# Patient Record
Sex: MALE | Race: ASIAN | Employment: FULL TIME | URBAN - METROPOLITAN AREA
[De-identification: names, ages, dates, MRNs, and addresses within clinical notes are randomized per-mention and may not be internally consistent; named-entity substitution may affect disease eponyms.]

---

## 2017-09-06 ENCOUNTER — APPOINTMENT (OUTPATIENT)
Dept: GENERAL RADIOLOGY | Facility: HOSPITAL | Age: 37
DRG: 418 | End: 2017-09-06
Attending: EMERGENCY MEDICINE
Payer: COMMERCIAL

## 2017-09-06 ENCOUNTER — HOSPITAL ENCOUNTER (INPATIENT)
Facility: HOSPITAL | Age: 37
LOS: 1 days | Discharge: HOME | DRG: 418 | End: 2017-09-08
Attending: EMERGENCY MEDICINE | Admitting: HOSPITALIST
Payer: COMMERCIAL

## 2017-09-06 ENCOUNTER — APPOINTMENT (OUTPATIENT)
Dept: ULTRASOUND IMAGING | Facility: HOSPITAL | Age: 37
DRG: 418 | End: 2017-09-06
Attending: EMERGENCY MEDICINE
Payer: COMMERCIAL

## 2017-09-06 DIAGNOSIS — K80.50 CHOLEDOCHOLITHIASIS: Primary | ICD-10-CM

## 2017-09-06 DIAGNOSIS — K81.9 CHOLECYSTITIS: ICD-10-CM

## 2017-09-06 DIAGNOSIS — D69.6 THROMBOCYTOPENIA (HCC): ICD-10-CM

## 2017-09-06 PROBLEM — R73.9 HYPERGLYCEMIA: Status: ACTIVE | Noted: 2017-09-06

## 2017-09-06 PROBLEM — D72.829 LEUKOCYTOSIS: Status: ACTIVE | Noted: 2017-09-06

## 2017-09-06 LAB
ALBUMIN SERPL-MCNC: 4 G/DL (ref 3.5–4.8)
ALP LIVER SERPL-CCNC: 63 U/L (ref 45–117)
ALT SERPL-CCNC: 43 U/L (ref 17–63)
AST SERPL-CCNC: 29 U/L (ref 15–41)
BASOPHILS # BLD AUTO: 0.03 X10(3) UL (ref 0–0.1)
BASOPHILS NFR BLD AUTO: 0.2 %
BILIRUB SERPL-MCNC: 0.9 MG/DL (ref 0.1–2)
BUN BLD-MCNC: 12 MG/DL (ref 8–20)
CALCIUM BLD-MCNC: 8.7 MG/DL (ref 8.3–10.3)
CHLORIDE: 104 MMOL/L (ref 101–111)
CO2: 26 MMOL/L (ref 22–32)
CREAT BLD-MCNC: 1.01 MG/DL (ref 0.7–1.3)
EOSINOPHIL # BLD AUTO: 0 X10(3) UL (ref 0–0.3)
EOSINOPHIL NFR BLD AUTO: 0 %
ERYTHROCYTE [DISTWIDTH] IN BLOOD BY AUTOMATED COUNT: 12.6 % (ref 11.5–16)
GLUCOSE BLD-MCNC: 124 MG/DL (ref 70–99)
HCT VFR BLD AUTO: 42.9 % (ref 37–53)
HGB BLD-MCNC: 14 G/DL (ref 13–17)
IMMATURE GRANULOCYTE COUNT: 0.09 X10(3) UL (ref 0–1)
IMMATURE GRANULOCYTE RATIO %: 0.5 %
LIPASE: 84 U/L (ref 73–393)
LYMPHOCYTES # BLD AUTO: 0.76 X10(3) UL (ref 0.9–4)
LYMPHOCYTES NFR BLD AUTO: 4.2 %
M PROTEIN MFR SERPL ELPH: 7.9 G/DL (ref 6.1–8.3)
MCH RBC QN AUTO: 29.2 PG (ref 27–33.2)
MCHC RBC AUTO-ENTMCNC: 32.6 G/DL (ref 31–37)
MCV RBC AUTO: 89.6 FL (ref 80–99)
MONOCYTES # BLD AUTO: 1.21 X10(3) UL (ref 0.1–0.6)
MONOCYTES NFR BLD AUTO: 6.7 %
NEUTROPHIL ABS PRELIM: 16 X10 (3) UL (ref 1.3–6.7)
NEUTROPHILS # BLD AUTO: 16 X10(3) UL (ref 1.3–6.7)
NEUTROPHILS NFR BLD AUTO: 88.4 %
PLATELET # BLD AUTO: 110 10(3)UL (ref 150–450)
POTASSIUM SERPL-SCNC: 4.2 MMOL/L (ref 3.6–5.1)
RBC # BLD AUTO: 4.79 X10(6)UL (ref 4.3–5.7)
RED CELL DISTRIBUTION WIDTH-SD: 41.4 FL (ref 35.1–46.3)
SODIUM SERPL-SCNC: 138 MMOL/L (ref 136–144)
WBC # BLD AUTO: 18.1 X10(3) UL (ref 4–13)

## 2017-09-06 PROCEDURE — 76700 US EXAM ABDOM COMPLETE: CPT | Performed by: EMERGENCY MEDICINE

## 2017-09-06 PROCEDURE — 71010 XR CHEST AP PORTABLE  (CPT=71010): CPT | Performed by: EMERGENCY MEDICINE

## 2017-09-06 RX ORDER — SODIUM CHLORIDE 9 MG/ML
INJECTION, SOLUTION INTRAVENOUS CONTINUOUS
Status: CANCELLED | OUTPATIENT
Start: 2017-09-06 | End: 2017-09-07

## 2017-09-06 RX ORDER — HYDROMORPHONE HYDROCHLORIDE 1 MG/ML
0.5 INJECTION, SOLUTION INTRAMUSCULAR; INTRAVENOUS; SUBCUTANEOUS EVERY 30 MIN PRN
Status: CANCELLED | OUTPATIENT
Start: 2017-09-06 | End: 2017-09-07

## 2017-09-06 RX ORDER — ONDANSETRON 2 MG/ML
4 INJECTION INTRAMUSCULAR; INTRAVENOUS EVERY 4 HOURS PRN
Status: CANCELLED | OUTPATIENT
Start: 2017-09-06

## 2017-09-06 RX ORDER — ONDANSETRON 2 MG/ML
4 INJECTION INTRAMUSCULAR; INTRAVENOUS ONCE
Status: COMPLETED | OUTPATIENT
Start: 2017-09-06 | End: 2017-09-06

## 2017-09-06 RX ORDER — HYDROMORPHONE HYDROCHLORIDE 1 MG/ML
1 INJECTION, SOLUTION INTRAMUSCULAR; INTRAVENOUS; SUBCUTANEOUS EVERY 30 MIN PRN
Status: DISCONTINUED | OUTPATIENT
Start: 2017-09-06 | End: 2017-09-08

## 2017-09-07 ENCOUNTER — ANESTHESIA (OUTPATIENT)
Dept: SURGERY | Facility: HOSPITAL | Age: 37
DRG: 418 | End: 2017-09-07
Payer: COMMERCIAL

## 2017-09-07 ENCOUNTER — APPOINTMENT (OUTPATIENT)
Dept: GENERAL RADIOLOGY | Facility: HOSPITAL | Age: 37
DRG: 418 | End: 2017-09-07
Attending: SURGERY
Payer: COMMERCIAL

## 2017-09-07 ENCOUNTER — SURGERY (OUTPATIENT)
Age: 37
End: 2017-09-07

## 2017-09-07 ENCOUNTER — ANESTHESIA EVENT (OUTPATIENT)
Dept: SURGERY | Facility: HOSPITAL | Age: 37
DRG: 418 | End: 2017-09-07
Payer: COMMERCIAL

## 2017-09-07 LAB
ALBUMIN SERPL-MCNC: 3.2 G/DL (ref 3.5–4.8)
ALP LIVER SERPL-CCNC: 57 U/L (ref 45–117)
ALT SERPL-CCNC: 33 U/L (ref 17–63)
AST SERPL-CCNC: 21 U/L (ref 15–41)
BASOPHILS # BLD AUTO: 0.03 X10(3) UL (ref 0–0.1)
BASOPHILS NFR BLD AUTO: 0.2 %
BILIRUB SERPL-MCNC: 1 MG/DL (ref 0.1–2)
BUN BLD-MCNC: 13 MG/DL (ref 8–20)
CALCIUM BLD-MCNC: 7.8 MG/DL (ref 8.3–10.3)
CHLORIDE: 106 MMOL/L (ref 101–111)
CO2: 27 MMOL/L (ref 22–32)
CREAT BLD-MCNC: 1.15 MG/DL (ref 0.7–1.3)
EOSINOPHIL # BLD AUTO: 0.01 X10(3) UL (ref 0–0.3)
EOSINOPHIL NFR BLD AUTO: 0.1 %
ERYTHROCYTE [DISTWIDTH] IN BLOOD BY AUTOMATED COUNT: 12.9 % (ref 11.5–16)
ERYTHROCYTE [DISTWIDTH] IN BLOOD BY AUTOMATED COUNT: 13 % (ref 11.5–16)
GLUCOSE BLD-MCNC: 108 MG/DL (ref 70–99)
HCT VFR BLD AUTO: 35.6 % (ref 37–53)
HCT VFR BLD AUTO: 36.5 % (ref 37–53)
HGB BLD-MCNC: 11.9 G/DL (ref 13–17)
HGB BLD-MCNC: 12.1 G/DL (ref 13–17)
IMMATURE GRANULOCYTE COUNT: 0.09 X10(3) UL (ref 0–1)
IMMATURE GRANULOCYTE RATIO %: 0.7 %
INR BLD: 1.41 (ref 0.89–1.11)
LYMPHOCYTES # BLD AUTO: 0.99 X10(3) UL (ref 0.9–4)
LYMPHOCYTES NFR BLD AUTO: 7.5 %
M PROTEIN MFR SERPL ELPH: 6.5 G/DL (ref 6.1–8.3)
MCH RBC QN AUTO: 30.1 PG (ref 27–33.2)
MCH RBC QN AUTO: 30.2 PG (ref 27–33.2)
MCHC RBC AUTO-ENTMCNC: 33.2 G/DL (ref 31–37)
MCHC RBC AUTO-ENTMCNC: 33.4 G/DL (ref 31–37)
MCV RBC AUTO: 90.4 FL (ref 80–99)
MCV RBC AUTO: 90.8 FL (ref 80–99)
MONOCYTES # BLD AUTO: 1.08 X10(3) UL (ref 0.1–0.6)
MONOCYTES NFR BLD AUTO: 8.2 %
NEUTROPHIL ABS PRELIM: 10.96 X10 (3) UL (ref 1.3–6.7)
NEUTROPHILS # BLD AUTO: 10.96 X10(3) UL (ref 1.3–6.7)
NEUTROPHILS NFR BLD AUTO: 83.3 %
PLATELET # BLD AUTO: 83 10(3)UL (ref 150–450)
PLATELET # BLD AUTO: 84 10(3)UL (ref 150–450)
POTASSIUM SERPL-SCNC: 3.8 MMOL/L (ref 3.6–5.1)
PSA SERPL DL<=0.01 NG/ML-MCNC: 17.4 SECONDS (ref 12–14.3)
RBC # BLD AUTO: 3.94 X10(6)UL (ref 4.3–5.7)
RBC # BLD AUTO: 4.02 X10(6)UL (ref 4.3–5.7)
RED CELL DISTRIBUTION WIDTH-SD: 42.9 FL (ref 35.1–46.3)
RED CELL DISTRIBUTION WIDTH-SD: 43.3 FL (ref 35.1–46.3)
SODIUM SERPL-SCNC: 140 MMOL/L (ref 136–144)
WBC # BLD AUTO: 13.2 X10(3) UL (ref 4–13)
WBC # BLD AUTO: 16.5 X10(3) UL (ref 4–13)

## 2017-09-07 PROCEDURE — BF13YZZ FLUOROSCOPY OF GALLBLADDER AND BILE DUCTS USING OTHER CONTRAST: ICD-10-PCS | Performed by: SURGERY

## 2017-09-07 PROCEDURE — 74300 X-RAY BILE DUCTS/PANCREAS: CPT | Performed by: SURGERY

## 2017-09-07 PROCEDURE — 0FT44ZZ RESECTION OF GALLBLADDER, PERCUTANEOUS ENDOSCOPIC APPROACH: ICD-10-PCS | Performed by: SURGERY

## 2017-09-07 PROCEDURE — 99223 1ST HOSP IP/OBS HIGH 75: CPT | Performed by: HOSPITALIST

## 2017-09-07 RX ORDER — HYDROMORPHONE HYDROCHLORIDE 1 MG/ML
0.4 INJECTION, SOLUTION INTRAMUSCULAR; INTRAVENOUS; SUBCUTANEOUS EVERY 2 HOUR PRN
Status: DISCONTINUED | OUTPATIENT
Start: 2017-09-07 | End: 2017-09-08

## 2017-09-07 RX ORDER — ONDANSETRON 2 MG/ML
4 INJECTION INTRAMUSCULAR; INTRAVENOUS EVERY 6 HOURS PRN
Status: DISCONTINUED | OUTPATIENT
Start: 2017-09-07 | End: 2017-09-07

## 2017-09-07 RX ORDER — HYDROCODONE BITARTRATE AND ACETAMINOPHEN 5; 325 MG/1; MG/1
2 TABLET ORAL EVERY 4 HOURS PRN
Status: DISCONTINUED | OUTPATIENT
Start: 2017-09-07 | End: 2017-09-08

## 2017-09-07 RX ORDER — HYDROCODONE BITARTRATE AND ACETAMINOPHEN 10; 325 MG/1; MG/1
1 TABLET ORAL AS NEEDED
Status: DISCONTINUED | OUTPATIENT
Start: 2017-09-07 | End: 2017-09-07 | Stop reason: HOSPADM

## 2017-09-07 RX ORDER — ACETAMINOPHEN 325 MG/1
650 TABLET ORAL EVERY 6 HOURS PRN
Status: DISCONTINUED | OUTPATIENT
Start: 2017-09-07 | End: 2017-09-08

## 2017-09-07 RX ORDER — HYDROMORPHONE HYDROCHLORIDE 1 MG/ML
0.2 INJECTION, SOLUTION INTRAMUSCULAR; INTRAVENOUS; SUBCUTANEOUS EVERY 2 HOUR PRN
Status: DISCONTINUED | OUTPATIENT
Start: 2017-09-07 | End: 2017-09-08

## 2017-09-07 RX ORDER — HYDROMORPHONE HYDROCHLORIDE 1 MG/ML
0.2 INJECTION, SOLUTION INTRAMUSCULAR; INTRAVENOUS; SUBCUTANEOUS EVERY 2 HOUR PRN
Status: DISCONTINUED | OUTPATIENT
Start: 2017-09-07 | End: 2017-09-07

## 2017-09-07 RX ORDER — HYDROMORPHONE HYDROCHLORIDE 1 MG/ML
0.8 INJECTION, SOLUTION INTRAMUSCULAR; INTRAVENOUS; SUBCUTANEOUS EVERY 2 HOUR PRN
Status: DISCONTINUED | OUTPATIENT
Start: 2017-09-07 | End: 2017-09-08

## 2017-09-07 RX ORDER — ACETAMINOPHEN 325 MG/1
650 TABLET ORAL EVERY 4 HOURS PRN
Status: DISCONTINUED | OUTPATIENT
Start: 2017-09-07 | End: 2017-09-07

## 2017-09-07 RX ORDER — HYDROMORPHONE HYDROCHLORIDE 1 MG/ML
0.8 INJECTION, SOLUTION INTRAMUSCULAR; INTRAVENOUS; SUBCUTANEOUS EVERY 2 HOUR PRN
Status: DISCONTINUED | OUTPATIENT
Start: 2017-09-07 | End: 2017-09-07

## 2017-09-07 RX ORDER — ENOXAPARIN SODIUM 100 MG/ML
40 INJECTION SUBCUTANEOUS DAILY
Status: DISCONTINUED | OUTPATIENT
Start: 2017-09-08 | End: 2017-09-08

## 2017-09-07 RX ORDER — POTASSIUM CHLORIDE 14.9 MG/ML
20 INJECTION INTRAVENOUS ONCE
Status: COMPLETED | OUTPATIENT
Start: 2017-09-07 | End: 2017-09-07

## 2017-09-07 RX ORDER — ACETAMINOPHEN 325 MG/1
TABLET ORAL
Status: DISPENSED
Start: 2017-09-07 | End: 2017-09-07

## 2017-09-07 RX ORDER — DEXAMETHASONE SODIUM PHOSPHATE 4 MG/ML
4 VIAL (ML) INJECTION AS NEEDED
Status: DISCONTINUED | OUTPATIENT
Start: 2017-09-07 | End: 2017-09-07 | Stop reason: HOSPADM

## 2017-09-07 RX ORDER — HYDROCODONE BITARTRATE AND ACETAMINOPHEN 5; 325 MG/1; MG/1
1 TABLET ORAL EVERY 4 HOURS PRN
Status: DISCONTINUED | OUTPATIENT
Start: 2017-09-07 | End: 2017-09-08

## 2017-09-07 RX ORDER — MIDAZOLAM HYDROCHLORIDE 1 MG/ML
1 INJECTION INTRAMUSCULAR; INTRAVENOUS EVERY 5 MIN PRN
Status: DISCONTINUED | OUTPATIENT
Start: 2017-09-07 | End: 2017-09-07 | Stop reason: HOSPADM

## 2017-09-07 RX ORDER — HYDROMORPHONE HYDROCHLORIDE 1 MG/ML
0.4 INJECTION, SOLUTION INTRAMUSCULAR; INTRAVENOUS; SUBCUTANEOUS EVERY 2 HOUR PRN
Status: DISCONTINUED | OUTPATIENT
Start: 2017-09-07 | End: 2017-09-07

## 2017-09-07 RX ORDER — HYDROMORPHONE HYDROCHLORIDE 1 MG/ML
0.4 INJECTION, SOLUTION INTRAMUSCULAR; INTRAVENOUS; SUBCUTANEOUS EVERY 5 MIN PRN
Status: DISCONTINUED | OUTPATIENT
Start: 2017-09-07 | End: 2017-09-07 | Stop reason: HOSPADM

## 2017-09-07 RX ORDER — HYDROCODONE BITARTRATE AND ACETAMINOPHEN 10; 325 MG/1; MG/1
2 TABLET ORAL AS NEEDED
Status: DISCONTINUED | OUTPATIENT
Start: 2017-09-07 | End: 2017-09-07 | Stop reason: HOSPADM

## 2017-09-07 RX ORDER — DIPHENHYDRAMINE HCL 25 MG
25 CAPSULE ORAL NIGHTLY PRN
Status: DISCONTINUED | OUTPATIENT
Start: 2017-09-07 | End: 2017-09-08

## 2017-09-07 RX ORDER — ONDANSETRON 2 MG/ML
4 INJECTION INTRAMUSCULAR; INTRAVENOUS AS NEEDED
Status: DISCONTINUED | OUTPATIENT
Start: 2017-09-07 | End: 2017-09-07 | Stop reason: HOSPADM

## 2017-09-07 RX ORDER — HYDROMORPHONE HYDROCHLORIDE 1 MG/ML
INJECTION, SOLUTION INTRAMUSCULAR; INTRAVENOUS; SUBCUTANEOUS
Status: COMPLETED
Start: 2017-09-07 | End: 2017-09-07

## 2017-09-07 RX ORDER — SODIUM CHLORIDE, SODIUM LACTATE, POTASSIUM CHLORIDE, CALCIUM CHLORIDE 600; 310; 30; 20 MG/100ML; MG/100ML; MG/100ML; MG/100ML
INJECTION, SOLUTION INTRAVENOUS CONTINUOUS
Status: DISCONTINUED | OUTPATIENT
Start: 2017-09-07 | End: 2017-09-08

## 2017-09-07 RX ORDER — NALOXONE HYDROCHLORIDE 0.4 MG/ML
80 INJECTION, SOLUTION INTRAMUSCULAR; INTRAVENOUS; SUBCUTANEOUS AS NEEDED
Status: DISCONTINUED | OUTPATIENT
Start: 2017-09-07 | End: 2017-09-07 | Stop reason: HOSPADM

## 2017-09-07 RX ORDER — SODIUM CHLORIDE 9 MG/ML
INJECTION, SOLUTION INTRAVENOUS CONTINUOUS
Status: DISCONTINUED | OUTPATIENT
Start: 2017-09-07 | End: 2017-09-08

## 2017-09-07 RX ORDER — BUPIVACAINE HYDROCHLORIDE AND EPINEPHRINE 5; 5 MG/ML; UG/ML
INJECTION, SOLUTION EPIDURAL; INTRACAUDAL; PERINEURAL AS NEEDED
Status: DISCONTINUED | OUTPATIENT
Start: 2017-09-07 | End: 2017-09-07 | Stop reason: HOSPADM

## 2017-09-07 RX ORDER — ONDANSETRON 2 MG/ML
4 INJECTION INTRAMUSCULAR; INTRAVENOUS EVERY 6 HOURS PRN
Status: DISCONTINUED | OUTPATIENT
Start: 2017-09-07 | End: 2017-09-08

## 2017-09-07 RX ORDER — MEPERIDINE HYDROCHLORIDE 25 MG/ML
12.5 INJECTION INTRAMUSCULAR; INTRAVENOUS; SUBCUTANEOUS AS NEEDED
Status: DISCONTINUED | OUTPATIENT
Start: 2017-09-07 | End: 2017-09-07 | Stop reason: HOSPADM

## 2017-09-07 NOTE — ANESTHESIA PREPROCEDURE EVALUATION
PRE-OP EVALUATION    Patient Name: Narendra Carty    Pre-op Diagnosis: Cholecystitis [K81.9]    Procedure(s):  LAPAROSCOPIC CHOLECYSTECTOMY WITH INTRAOPERATIVE CHOLANGIOGRAM    Surgeon(s) and Role:     Lily Gil MD - Primary    Pre-o Pulmonary    Negative pulmonary ROS. Neuro/Psych    Negative neuro/psych ROS. History reviewed. No pertinent surgical history.      Smoking status: Never Smoker    Smokeless tobacco: Never Used    [employer]

## 2017-09-07 NOTE — PLAN OF CARE
PAIN - ADULT    • Verbalizes/displays adequate comfort level or patient's stated pain goal Progressing        RISK FOR INFECTION - ADULT    • Absence of fever/infection during anticipated neutropenic period Progressing        Upon assessment, A&OX4.  Pain r

## 2017-09-07 NOTE — PAYOR COMM NOTE
--------------  ADMISSION REVIEW     Payor: [facility] #:  [id number]  Authorization Number: [id number]    Admit date: 9/7/17  Admit time: 5193       Admitting Physician: Jairon Chun DO  Attending Physician:  Abraham Winston MD Temporal  SpO2: 98 %  O2 Device: None (Room air)    Current:/71   Pulse 92   Temp 100.1 °F (37.8 °C) (Temporal)   Resp 16   Wt 78 kg   SpO2 92%         Physical Exam  General: This a pleasant nontoxic appearing patient in no apparent distress alert a CBC W/ DIFFERENTIAL[[id number]]          Abnormal            Final result                 Please view results for these tests on the individual orders.      PROCEDURE:  XR CHEST AP PORTABLE (CPT=71010)     TECHNIQUE:  AP chest radiograph was o 21:00       Approved by: Félix Felipe MD              ============================================================  ED Course  ------------------------------------------------------------  MDM   Patient's white count was 18,000.   His transaminases were no Abdominal pain  History of Present Illness: Epifanio Cox is a 40year old male with no significant past medical history woke up earlier yesterday morning with abdominal pain pain was in the right upper quadrant radiating to the back was a d rhonchi. Cardiovascular: S1, S2. Regular rate and rhythm. No murmurs, rubs or gallops. Equal pulses. Chest and Back: No tenderness or deformity. Abdomen: Soft, tendernes in right upper quadrant on palpation, nondistended. Positive bowel sounds.  No nehemiah DAY:  acetaminophen (TYLENOL) tab 650 mg     Date Action Dose Route User    9/7/2017 0106 Given 650 mg Oral Ailyn Brizuela, RN      CefOXitin Sodium (MEFOXIN) 1 g in sodium chloride 0.9 % 100 mL IVPB-minibag     Date Action Dose Route User    9/7/2017 5683

## 2017-09-07 NOTE — ANESTHESIA POSTPROCEDURE EVALUATION
[address] Patient Status:  Inpatient   Age/Gender 40year old male MRN [id number]   Location [address] [facility] Attending Gretta Orona MD   Hosp Day # 0 PCP No primary care provider on file.        Anesthesia Pos

## 2017-09-07 NOTE — ED INITIAL ASSESSMENT (HPI)
Pt started with periumbilical abd pain yesterday, today RLQ pain. Pt works at [employer] in [place] had [address] done there yesterday, discharged today. Pt deny fever. Vomiting yesterday several times, none today.

## 2017-09-07 NOTE — CONSULTS
[facility]  Report of Consultation    Meagan Ceciltommy Villa Patient Status:  Inpatient    1980 MRN [id number]   [facility] 3NW-A Attending Abraham Winston MD   Hosp Day # 0 PCP No primary care provider on file.      Reason f smokeless tobacco. He reports that he does not drink alcohol.     Allergies:  No Known Allergies    Medications:    Current Facility-Administered Medications:   •  0.9%  NaCl infusion, , Intravenous, Continuous  •  HYDROmorphone HCl PF (DILAUDID) 1 MG/ML in cough, dyspnea and wheezing      Physical Exam:  Blood pressure 118/73, pulse 97, temperature 97.9 °F (36.6 °C), temperature source Oral, resp. rate 20, weight 172 lb, SpO2 98 %. General: Alert, orientated x3. Cooperative. No apparent distress.     KIM liver.  BILIARY:  Echogenic mobile foci within the gallbladder consistent with multiple gallstones. Gallbladder wall thickness is at the limits of normal measuring 3 mm. The common bile duct is slightly enlarged at 7 mm.  There is no sonographic Payne sign and was evaluated at [facility]. He was told to consider cholecystectomy but as he was feeling better, he chose to discharge  From the hospital and return home to [place].   He was able to travel home but came to [facility] for increased abdominal

## 2017-09-07 NOTE — CONSULTS
[facility]                       Gastroenterology Consultation-[facility]    Ruel Marisol Dennis Patient Status:  Inpatient    1980 MRN [id number]   [facility] 3NW-A Attending Zbigniew Prather MD   [facility]) injection 4 mg 4 mg Intravenous Q6H PRN   acetaminophen (TYLENOL) tab 650 mg 650 mg Oral Q6H PRN   acetaminophen (TYLENOL) 325 MG tab      potassium chloride IVPB premix 20 mEq 20 mEq Intravenous Once   [COMPLETED] sodium chloride 0.9% IV bolus 1, hoarseness of voice, hearing loss, sinus congestion, tinnitus           Neurologic: The patient reports no history of seizure, stroke, or frequent headaches  PE: /73 (BP Location: Right arm)   Pulse 97   Temp 97.9 °F (36.6 °C) (Oral)   Resp 20   Wt 1  XR CHEST AP PORTABLE (CPT=71010)     TECHNIQUE:  AP chest radiograph was obtained.     COMPARISON:  None.     INDICATIONS:  ABD PAIN     PATIENT STATED HISTORY: (As transcribed by Technologist)  Patient offered no additional history at this time.         gallbladder wall thickening, and a CBD 7 mm; labs reveal leukocytosis (18.1) and normal LFTs/lipase. Abd is soft, no tenderness after narcotics. On for lap nadine today with IOC; if IOC is positive will plan for ERCP in AM    Recommendations:     1.  Plan fo

## 2017-09-07 NOTE — PROGRESS NOTES
RECEIVED PT FROM PACU , ALERT AND ORIENT X 3 , VERY SLEEPY , EASILY AWAKE, LAP X 3 WITH BAND AID AND STERI STRIPS . ABD DISTENDED AND TENDER ,JPX 1 , DRAINING BLOODY DRAINAGE . DTV , B/L SCD'S ON . ON CLEAR LIQUID DIET .  VITALS STABLE , O2 2L/NC , CPOX , O

## 2017-09-07 NOTE — ED PROVIDER NOTES
Patient Seen in: [facility] Emergency Department    History   Patient presents with:  Abdomen/Flank Pain (GI/)    Stated Complaint: ABD PAIN    HPI    40-year-old male who presents here to the emergency department with epigastric and right upper q bilaterally. No JVD or adenopathy. Lungs: Clear to auscultation bilaterally. No wheezes, rhonchi, or rales appreciated. No accessory muscle use noted for breathing. Cardiac: Regular rate and rhythm.   Normal S1 and 2 without murmurs or ectopy appreciat consolidation.      Impression     CONCLUSION:  Low lung volumes.           Dictated by: Tanya Scott MD on 9/06/2017 at 21:22       Approved by: Tanya Scott MD          PROCEDURE:  US ABDOMEN COMPLETE (CPT=76700)     COMPARISON:  None.     IN was admitted to the hospital.  I spoke to the hospital service as well as gastroenterology. He was admitted for further care.         Disposition and Plan     Clinical Impression:  Choledocholithiasis  (primary encounter diagnosis)    Disposition:  Admit

## 2017-09-07 NOTE — H&P
[facility]ITAL[facility]  History and Physical     Ruel Gonzalez Patient Status:  Inpatient    1980 MRN [id number]   [facility] 3NW-A Attending Jay Cisneros [address] Day # 0 PCP No primary care provider on file. and negatives noted in the HPI. Physical Exam:    BP 93/64   Pulse 86   Temp 98.4 °F (36.9 °C) (Oral)   Resp 20   Wt 172 lb (78 kg)   SpO2 100%   General: No acute distress. Alert and oriented x 3. HEENT: Normocephalic atraumatic.  Moist mucous membrane platelet levels. Quality:  · DVT Prophylaxis: SCDs  · CODE status: Full  · Casillas: None    Plan of care discussed with patient at bedside.     Reji Goff DO  9/7/2017

## 2017-09-08 VITALS
HEART RATE: 98 BPM | TEMPERATURE: 99 F | SYSTOLIC BLOOD PRESSURE: 126 MMHG | DIASTOLIC BLOOD PRESSURE: 85 MMHG | RESPIRATION RATE: 18 BRPM | OXYGEN SATURATION: 90 % | WEIGHT: 172 LBS

## 2017-09-08 LAB
ALBUMIN SERPL-MCNC: 2.7 G/DL (ref 3.5–4.8)
ALP LIVER SERPL-CCNC: 70 U/L (ref 45–117)
ALT SERPL-CCNC: 27 U/L (ref 17–63)
AST SERPL-CCNC: 16 U/L (ref 15–41)
BILIRUB SERPL-MCNC: 0.6 MG/DL (ref 0.1–2)
BUN BLD-MCNC: 11 MG/DL (ref 8–20)
CALCIUM BLD-MCNC: 8.2 MG/DL (ref 8.3–10.3)
CHLORIDE: 105 MMOL/L (ref 101–111)
CO2: 26 MMOL/L (ref 22–32)
CREAT BLD-MCNC: 0.85 MG/DL (ref 0.7–1.3)
GLUCOSE BLD-MCNC: 112 MG/DL (ref 70–99)
M PROTEIN MFR SERPL ELPH: 6.7 G/DL (ref 6.1–8.3)
POTASSIUM SERPL-SCNC: 3.7 MMOL/L (ref 3.6–5.1)
SODIUM SERPL-SCNC: 139 MMOL/L (ref 136–144)

## 2017-09-08 PROCEDURE — 99232 SBSQ HOSP IP/OBS MODERATE 35: CPT | Performed by: HOSPITALIST

## 2017-09-08 RX ORDER — HYDROCODONE BITARTRATE AND ACETAMINOPHEN 5; 325 MG/1; MG/1
1 TABLET ORAL EVERY 4 HOURS PRN
Qty: 30 TABLET | Refills: 0 | Status: SHIPPED | OUTPATIENT
Start: 2017-09-08

## 2017-09-08 RX ORDER — POTASSIUM CHLORIDE 20 MEQ/1
40 TABLET, EXTENDED RELEASE ORAL ONCE
Status: COMPLETED | OUTPATIENT
Start: 2017-09-08 | End: 2017-09-08

## 2017-09-08 NOTE — PROGRESS NOTES
Gastroenterology Progress Note  Patti Alba Patient Status:  Inpatient    1980 MRN [id number]   [facility] 3NW-A Attending Mona Benavidez MD   Hosp Day # 1 PCP No primar cholelithiasis, gallbladder wall thickening and a CBD 7 mm. Lap nadine 9/7 with negative IOC; LFTs remain normal this AM.   Plan:   No further GI recommendations at this time. Will sign off.  Please feel free to contact us with further questions or concerns

## 2017-09-08 NOTE — PLAN OF CARE
GASTROINTESTINAL - ADULT    • Maintains or returns to baseline bowel function Adequate for Discharge        PAIN - ADULT    • Verbalizes/displays adequate comfort level or patient's stated pain goal Adequate for Discharge        Patient/Family Goals    • P

## 2017-09-08 NOTE — BRIEF OP NOTE
Pre-Operative Diagnosis: Cholecystitis [K81.9]  cholelithiasis     Post-Operative Diagnosis: same     Procedure Performed:   Procedure(s):  LAPAROSCOPIC CHOLECYSTECTOMY WITH INTRAOPERATIVE CHOLANGIOGRAM    Surgeon(s) and Role:     Terra Homans, MD

## 2017-09-08 NOTE — OPERATIVE REPORT
[facility]   Operative Note    Jetty Cabrera Location: OR   CSN [id number] MRN [id number]   Admission Date 9/6/2017 Operation Date 9/7/2017   Attending Physician Homer Rosas MD Operating Physician William Gan MD     Date of proced seroma/hematoma formation, postoperative wound complications including development of a hernia, trocar injury, intra-abdominal organ injury, bile leakage, biloma formation, common bile duct injury, conversion to an open procedure, possible need for a drain duct-gallbladder junction was performed to define the cystic duct for sufficient length. Dissection was kept above the line of Rouviere and a critical view was obtained. The cystic duct was then clipped once proximally on the specimen side.   A ductotomy in a subcuticular manner. All sponge, instrument and needle counts were correct at the conclusion of the procedure. The patient did tolerate the procedure well. The patient was taken to the recovery room in stable condition.     Complications:  None    EB

## 2017-09-08 NOTE — PLAN OF CARE
GASTROINTESTINAL - ADULT    • Maintains or returns to baseline bowel function Progressing        PAIN - ADULT    • Verbalizes/displays adequate comfort level or patient's stated pain goal Progressing        Patient/Family Goals    • Patient/Family Long Ter

## 2017-09-08 NOTE — PROGRESS NOTES
[facility]  Progress Note    Maggie Wen Patient Status:  Inpatient    1980 MRN [id number]   [facility] 3NW-A Attending Clayton Porter MD   Hosp Day # 1 PCP No primary care provider on file.      Subjective:  Pt r surgical incisions are clean and dry  Discharge instructions are reviewed with the patient as well as his wife. They do not have any further questions at this time and wish to proceed with discharge today as discussed.   Mayito-Wilkerson drain management was

## 2017-09-08 NOTE — PROGRESS NOTES
[facility]  Progress Note     Terri Ramirez Patient Status:  Inpatient    1980 MRN [id number]   [facility] 3NW-A Attending Latrice Bauer MD   Hosp Day # 1 PCP No primary care provider on file.      Chief Compl Sx  2. Atelectasis with hypoxia  1. aggressive IS and increase activity  3.  Leukocytosis and thrombocytopenia suspect 2/2 #1- patient does not look septic   1. monitor closely    Possible DC later today pending his clinical course     Quality:  · DVT Proph

## 2017-09-08 NOTE — PROGRESS NOTES
NURSING DISCHARGE NOTE    Discharged Home via Wheelchair. Accompanied by Family member and Support staff  Belongings Taken by patient/family DISCUSSED D/C INSTRUCTIONS WITH PT AND FAMILY . ANSWERED ALL QUESTIONS . VERBALIZED UNDERSTANDING .  DRAIN CARE

## 2017-09-09 NOTE — DISCHARGE SUMMARY
[facility] [place] [facility]  DISCHARGE SUMMARY     Ruel Sunshine Files Patient Status:  Inpatient    1980 MRN [id number]   [facility] 3NW-A Attending Sarath Mueller MD   Hosp Day # 1 PCP No primary care provider on file.      Date of Admi tingling extremities or any focal weakness. No hematochezia, melena, hematuria, hemoptysis, hematemesis. Brief Synopsis: Abdominal pain 2/2 acute nadine. Sx consulted and patient underwent lap nadine without complication.   The patient had mild hypoxia l

## 2017-09-14 ENCOUNTER — OFFICE VISIT (OUTPATIENT)
Dept: SURGERY | Facility: CLINIC | Age: 37
End: 2017-09-14

## 2017-09-14 VITALS
SYSTOLIC BLOOD PRESSURE: 100 MMHG | DIASTOLIC BLOOD PRESSURE: 69 MMHG | TEMPERATURE: 99 F | BODY MASS INDEX: 24.05 KG/M2 | HEIGHT: 70 IN | WEIGHT: 168 LBS | HEART RATE: 84 BPM

## 2017-09-14 DIAGNOSIS — K80.00 CALCULUS OF GALLBLADDER WITH ACUTE CHOLECYSTITIS WITHOUT OBSTRUCTION: Primary | ICD-10-CM

## 2017-09-14 PROBLEM — K80.20 CHOLELITHIASIS: Status: ACTIVE | Noted: 2017-09-06

## 2017-09-14 PROCEDURE — 99024 POSTOP FOLLOW-UP VISIT: CPT | Performed by: PHYSICIAN ASSISTANT

## 2017-09-14 NOTE — PROGRESS NOTES
Post Operative Visit Note       Active Problems  1. Calculus of gallbladder with acute cholecystitis without obstruction         Chief Complaint   Patient presents with:  Post-Op: 9/7 lap nadine, with drain. Minimal output from drain, tolerating diet. ** Merged History Encounter **              Current Outpatient Prescriptions:  HYDROcodone-acetaminophen 5-325 MG Oral Tab Take 1 tablet by mouth every 4 (four) hours as needed.  Disp: 30 tablet Rfl: 0         Review of Systems  The Review of Systems has be intact, without erythema, or cellulitis. At today's office visit the patient's surgical drain was removed sterilely without complication. The drain site was then dressed with a dry gauze bandage.    Neurological: He is alert and oriented to person, plac plan.           No orders of the defined types were placed in this encounter. Imaging & Referrals   None    Follow Up  Return if symptoms worsen or fail to improve.     Indiana Colbert

## 2021-09-07 ENCOUNTER — TELEPHONE (OUTPATIENT)
Dept: ORTHOPEDICS CLINIC | Facility: CLINIC | Age: 41
End: 2021-09-07

## 2021-09-07 DIAGNOSIS — M25.561 RIGHT KNEE PAIN, UNSPECIFIED CHRONICITY: Primary | ICD-10-CM

## 2021-09-07 NOTE — TELEPHONE ENCOUNTER
Please enter an Xray RX for a RT KNEE for  this patient's upcoming appointment, as the patient has not had any imaging completed. Patient was instructed to get X-rays 20 min before appt.   PLEASE REPLY TO THIS MESSAGE when the order is put in EPIC so that

## 2021-09-08 NOTE — TELEPHONE ENCOUNTER
• Reviewed patients chart, xray orders are required. Order placed for right knee xrays  • Please contact patient advise to arrive 30 mins prior to patients appt to complete x-ray order.    • Please schedule patients xray appt-Thank you

## 2021-09-22 ENCOUNTER — HOSPITAL ENCOUNTER (OUTPATIENT)
Dept: GENERAL RADIOLOGY | Age: 41
Discharge: HOME | End: 2021-09-22
Attending: ORTHOPAEDIC SURGERY
Payer: COMMERCIAL

## 2021-09-22 ENCOUNTER — OFFICE VISIT (OUTPATIENT)
Dept: ORTHOPEDICS CLINIC | Facility: CLINIC | Age: 41
End: 2021-09-22
Payer: COMMERCIAL

## 2021-09-22 DIAGNOSIS — M25.561 ACUTE PAIN OF RIGHT KNEE: Primary | ICD-10-CM

## 2021-09-22 DIAGNOSIS — M25.561 RIGHT KNEE PAIN, UNSPECIFIED CHRONICITY: ICD-10-CM

## 2021-09-22 PROCEDURE — 99203 OFFICE O/P NEW LOW 30 MIN: CPT | Performed by: ORTHOPAEDIC SURGERY

## 2021-09-22 PROCEDURE — 73564 X-RAY EXAM KNEE 4 OR MORE: CPT | Performed by: ORTHOPAEDIC SURGERY

## 2021-09-22 RX ORDER — MELOXICAM 15 MG/1
15 TABLET ORAL DAILY
Qty: 14 TABLET | Refills: 1 | Status: SHIPPED | OUTPATIENT
Start: 2021-09-22

## 2021-09-22 NOTE — H&P
[facility] - ORTHOPEDICS  [facility] [id number] [phone]     NEW PATIENT VISIT - HISTORY AND PHYSICAL EXAMINATION     Name: Long Donis   MRN: [id number]  Date: 9/22/2021     CC: Right 9/14/17: 5' 10\" (1.778 m). Weight as of 9/14/17: 168 lb (76.2 kg). Physical Exam  Constitutional:       Appearance: Normal appearance. HENT:      Head: Normocephalic and atraumatic. Eyes:      Extraocular Movements: Extraocular movements intact. Yesica Sy is a 39year old male with right knee pain with mild flexion and overall normal physical examination and radiographic findings.  I think he is a good candidate for a short course of oral anti-inflammatories coupled with with  physical th

## (undated) DEVICE — SUTURE VICRYL 0 UR-6

## (undated) DEVICE — DRAIN SILICONE FLAT 10MM

## (undated) DEVICE — ENDOPATH XCEL UNIVERSAL TROCAR STABLILITY SLEEVES: Brand: ENDOPATH XCEL

## (undated) DEVICE — GLOVE BIOGEL M SURG SZ 6-1/2

## (undated) DEVICE — CHLORAPREP 26ML APPLICATOR

## (undated) DEVICE — PDS II VLT 0 107CM AG ST3: Brand: ENDOLOOP

## (undated) DEVICE — OPEN-END FLEXI-TIP URETERAL CATHETER: Brand: FLEXI-TIP

## (undated) DEVICE — DRAPE C-ARM UNIVERSAL

## (undated) DEVICE — SOL  .9 1000ML BTL

## (undated) DEVICE — ENDOPATH XCEL DILATING TIP TROCARS WITH STABILITY SLEEVES: Brand: ENDOPATH XCEL

## (undated) DEVICE — ZIPWIRE GUIDEWIRE .035X150 STR

## (undated) DEVICE — LAP CHOLE/APPY CDS-LF: Brand: MEDLINE INDUSTRIES, INC.

## (undated) DEVICE — SYRINGE 30ML LL TIP

## (undated) DEVICE — KENDALL SCD EXPRESS SLEEVES, KNEE LENGTH, MEDIUM: Brand: KENDALL SCD

## (undated) DEVICE — LIGACLIP EXTRA LIGATING CLIP CARTRIDGES: 6 TITANIUM CLIPS/ CARTRIDGE (LARGE): Brand: LIGACLIP

## (undated) DEVICE — BANDAID COVERLET 1X3

## (undated) DEVICE — SUTURE VICRYL 5-0 RB-1

## (undated) DEVICE — SUTURE VICRYL 0

## (undated) DEVICE — DRAIN RELIAVAC 100CC

## (undated) DEVICE — ABSORBABLE HEMOSTAT (OXIDIZED REGENERATED CELLULOSE, U.S.P.): Brand: SURGICEL

## (undated) DEVICE — LIGAMAX 5 MM ENDOSCOPIC MULTIPLE CLIP APPLIER: Brand: LIGAMAX

## (undated) DEVICE — ENDOPATH XCEL WITH OPTIVIEW TECHNOLOGY BLADELESS TROCARS WITH STABILITY SLEEVES: Brand: ENDOPATH XCEL OPTIVIEW

## (undated) DEVICE — DRAPE HALF 40X58 DYNJP2410

## (undated) NOTE — LETTER
[facility]  [address]amadou Alonso 61 [address] [facility], [address] [place] [address]    Consent for Operation    Date: __________________    Time: _______________    1.  I authorize the performance upon Ruel Villa the following operation:    Procedure(s):  LAPAROS procedure has been videotaped, the surgeon will obtain the original videotape. The hospital will not be responsible for storage or maintenance of this tape.     6. For the purpose of advancing medical education, I consent to the admittance of observers to t STATEMENTS REQUIRING INSERTION OR COMPLETION WERE FILLED IN.     Signature of Patient:   ___________________________    When the patient is a minor or mentally incompetent to give consent:  Signature of person authorized to consent for patient: ____________ supplements, and pills I can buy without a prescription (including street drugs/illegal medications). Failure to inform my anesthesiologist about these medicines may increase my risk of anesthetic complications.   · If I am allergic to anything or have had Anesthesiologist Signature     Date   Time  I have discussed the procedure and information above with the patient (or patient’s representative) and answered their questions. The patient or their representative has agreed to have anesthesia services.     ___

## (undated) NOTE — LETTER
[facility]  Mamadou Alonso 61 [address] [facility], [address] [place] [address]    Consent for Operation    Date: __________________    Time: _______________    1.  I authorize the performance upon Ruel Villa the following operation:    Procedure(s):  LAPAROS procedure has been videotaped, the surgeon will obtain the original videotape. The hospital will not be responsible for storage or maintenance of this tape.     6. For the purpose of advancing medical education, I consent to the admittance of observers to t STATEMENTS REQUIRING INSERTION OR COMPLETION WERE FILLED IN.     Signature of Patient:   ___________________________    When the patient is a minor or mentally incompetent to give consent:  Signature of person authorized to consent for patient: ____________ supplements, and pills I can buy without a prescription (including street drugs/illegal medications). Failure to inform my anesthesiologist about these medicines may increase my risk of anesthetic complications.   · If I am allergic to anything or have had Anesthesiologist Signature     Date   Time  I have discussed the procedure and information above with the patient (or patient’s representative) and answered their questions. The patient or their representative has agreed to have anesthesia services.     ___